# Patient Record
Sex: FEMALE | Race: BLACK OR AFRICAN AMERICAN | NOT HISPANIC OR LATINO | URBAN - METROPOLITAN AREA
[De-identification: names, ages, dates, MRNs, and addresses within clinical notes are randomized per-mention and may not be internally consistent; named-entity substitution may affect disease eponyms.]

---

## 2020-07-25 ENCOUNTER — TELEPHONE ENCOUNTER (OUTPATIENT)
Dept: URBAN - METROPOLITAN AREA CLINIC 13 | Facility: CLINIC | Age: 40
End: 2020-07-25

## 2020-07-25 RX ORDER — LANSOPRAZOLE 30 MG/1
TAKE 1 CAPSULE DAILY CAPSULE, DELAYED RELEASE ORAL
Qty: 30 | Refills: 5 | OUTPATIENT
Start: 2012-06-21

## 2020-07-25 RX ORDER — DEXLANSOPRAZOLE 60 MG/1
TAKE 1 CAPSULE DAILY EVERY MORNING BEFORE BREAKFAST CAPSULE, DELAYED RELEASE ORAL
Qty: 90 | Refills: 3 | OUTPATIENT
Start: 2012-06-20 | End: 2012-07-16

## 2020-07-25 RX ORDER — LANSOPRAZOLE 30 MG/1
TAKE 1 CAPSULE EVERY MORNING DAILY CAPSULE, DELAYED RELEASE ORAL
Qty: 90 | Refills: 3 | OUTPATIENT
Start: 2012-06-22 | End: 2012-07-31

## 2020-07-25 RX ORDER — ZOLPIDEM TARTRATE 10 MG/1
TAKE 1 TABLET DAILY MOMDAY-FRIDAY TABLET, FILM COATED ORAL
Qty: 15 | Refills: 0 | OUTPATIENT
Start: 2012-06-05 | End: 2012-07-31

## 2020-07-26 ENCOUNTER — TELEPHONE ENCOUNTER (OUTPATIENT)
Dept: URBAN - METROPOLITAN AREA CLINIC 13 | Facility: CLINIC | Age: 40
End: 2020-07-26

## 2020-07-26 RX ORDER — CLINDAMYCIN PHOSPHATE 100 MG/1
SUPPOSITORY VAGINAL
Qty: 3 | Refills: 0 | Status: ACTIVE | COMMUNITY
Start: 2011-12-29

## 2020-07-26 RX ORDER — CHLORHEXIDINE GLUCONATE 4 %
TAKE 1 TABLET BY MOUTH EVERY DAY LIQUID (ML) TOPICAL
Qty: 30 | Refills: 0 | Status: ACTIVE | COMMUNITY
Start: 2011-12-27

## 2020-07-26 RX ORDER — AMOXICILLIN 875 MG/1
TABLET, FILM COATED ORAL
Qty: 20 | Refills: 0 | Status: ACTIVE | COMMUNITY
Start: 2012-01-08

## 2020-07-26 RX ORDER — NORGESTIMATE AND ETHINYL ESTRADIOL
KIT
Qty: 28 | Refills: 0 | Status: ACTIVE | COMMUNITY
Start: 2012-01-15

## 2020-07-26 RX ORDER — NORGESTIMATE AND ETHINYL ESTRADIOL
KIT
Qty: 28 | Refills: 0 | Status: ACTIVE | COMMUNITY
Start: 2011-10-23

## 2020-07-26 RX ORDER — FLUCONAZOLE 150 MG/1
TABLET ORAL
Qty: 2 | Refills: 0 | Status: ACTIVE | COMMUNITY
Start: 2012-03-11

## 2020-07-26 RX ORDER — FLUTICASONE PROPIONATE 50 UG/1
INHALE 2 SPRAYS INTO EACH NOSTRIL EVERY DAY SPRAY, METERED NASAL
Qty: 16 | Refills: 0 | Status: ACTIVE | COMMUNITY
Start: 2012-01-08

## 2020-07-26 RX ORDER — NORGESTIMATE AND ETHINYL ESTRADIOL
TAKE 1 TABLET BY MOUTH EVERY DAY KIT
Qty: 28 | Refills: 0 | Status: ACTIVE | COMMUNITY
Start: 2011-11-22

## 2020-07-26 RX ORDER — DEXTROSE 4 G
TAKE 1 TABLET EVERY DAY TABLET,CHEWABLE ORAL
Qty: 30 | Refills: 0 | Status: ACTIVE | COMMUNITY
Start: 2012-07-10

## 2020-07-26 RX ORDER — NYSTATIN AND TRIAMCINOLONE ACETONIDE 100000; 1 MG/G; MG/G
CREAM TOPICAL
Qty: 15 | Refills: 0 | Status: ACTIVE | COMMUNITY
Start: 2012-02-03

## 2020-07-26 RX ORDER — FLUCONAZOLE 150 MG/1
TAKE 1 TABLET BY MOUTH NOW AND REPEAT IN 72 HOURS TABLET ORAL
Qty: 2 | Refills: 0 | Status: ACTIVE | COMMUNITY
Start: 2012-03-09

## 2020-07-26 RX ORDER — LORATADINE 5 MG/5 ML
TAKE AS DIRECTED SOLUTION, ORAL ORAL
Qty: 10 | Refills: 0 | Status: ACTIVE | COMMUNITY
Start: 2012-07-10

## 2020-07-26 RX ORDER — FAMOTIDINE 20 MG/1
TAKE 1 TABLET BY MOUTH EVERY DAY TABLET ORAL
Qty: 50 | Refills: 0 | Status: ACTIVE | COMMUNITY
Start: 2012-06-05

## 2021-12-08 ENCOUNTER — TELEPHONE ENCOUNTER (OUTPATIENT)
Dept: URBAN - METROPOLITAN AREA CLINIC 113 | Facility: CLINIC | Age: 41
End: 2021-12-08

## 2021-12-08 ENCOUNTER — OFFICE VISIT (OUTPATIENT)
Dept: URBAN - METROPOLITAN AREA CLINIC 107 | Facility: CLINIC | Age: 41
End: 2021-12-08
Payer: COMMERCIAL

## 2021-12-08 ENCOUNTER — WEB ENCOUNTER (OUTPATIENT)
Dept: URBAN - METROPOLITAN AREA CLINIC 107 | Facility: CLINIC | Age: 41
End: 2021-12-08

## 2021-12-08 VITALS
HEIGHT: 62 IN | HEART RATE: 84 BPM | SYSTOLIC BLOOD PRESSURE: 116 MMHG | WEIGHT: 149 LBS | DIASTOLIC BLOOD PRESSURE: 85 MMHG | TEMPERATURE: 98.8 F | BODY MASS INDEX: 27.42 KG/M2 | RESPIRATION RATE: 18 BRPM

## 2021-12-08 DIAGNOSIS — K21.9 GERD: ICD-10-CM

## 2021-12-08 DIAGNOSIS — R13.14 PHARYNGOESOPHAGEAL DYSPHAGIA: ICD-10-CM

## 2021-12-08 DIAGNOSIS — K59.09 CHANGE IN BOWEL MOVEMENTS INTERMITTENT CONSTIPATION. URGENCY IN THE MORNING.: ICD-10-CM

## 2021-12-08 DIAGNOSIS — R05.9 COUGH: ICD-10-CM

## 2021-12-08 DIAGNOSIS — R14.2 ERUCTATION: ICD-10-CM

## 2021-12-08 PROCEDURE — 99244 OFF/OP CNSLTJ NEW/EST MOD 40: CPT | Performed by: NURSE PRACTITIONER

## 2021-12-08 PROCEDURE — 99204 OFFICE O/P NEW MOD 45 MIN: CPT | Performed by: NURSE PRACTITIONER

## 2021-12-08 RX ORDER — DEXTROSE 4 G
TAKE 1 TABLET EVERY DAY TABLET,CHEWABLE ORAL
Qty: 30 | Refills: 0 | Status: ON HOLD | COMMUNITY
Start: 2012-07-10

## 2021-12-08 RX ORDER — FAMOTIDINE 20 MG/1
TAKE 1 TABLET BY MOUTH EVERY DAY TABLET ORAL
Qty: 50 | Refills: 0 | Status: ON HOLD | COMMUNITY
Start: 2012-06-05

## 2021-12-08 RX ORDER — ESOMEPRAZOLE MAGNESIUM 40 MG/1
1 CAPSULE CAPSULE, DELAYED RELEASE ORAL ONCE A DAY
OUTPATIENT

## 2021-12-08 RX ORDER — CHLORHEXIDINE GLUCONATE 4 %
TAKE 1 TABLET BY MOUTH EVERY DAY LIQUID (ML) TOPICAL
Qty: 30 | Refills: 0 | Status: ON HOLD | COMMUNITY
Start: 2011-12-27

## 2021-12-08 RX ORDER — AMOXICILLIN 875 MG/1
TABLET, FILM COATED ORAL
Qty: 20 | Refills: 0 | Status: ON HOLD | COMMUNITY
Start: 2012-01-08

## 2021-12-08 RX ORDER — CLINDAMYCIN PHOSPHATE 100 MG/1
SUPPOSITORY VAGINAL
Qty: 3 | Refills: 0 | Status: ON HOLD | COMMUNITY
Start: 2011-12-29

## 2021-12-08 RX ORDER — ESOMEPRAZOLE MAGNESIUM 40 MG/1
1 CAPSULE CAPSULE, DELAYED RELEASE ORAL ONCE A DAY
Status: ACTIVE | COMMUNITY

## 2021-12-08 RX ORDER — FAMOTIDINE 40 MG/1
1 TABLET AT BEDTIME TABLET, FILM COATED ORAL ONCE A DAY
Qty: 90 TABLETS | Refills: 3 | OUTPATIENT
Start: 2021-12-08

## 2021-12-08 RX ORDER — LORATADINE 5 MG/5 ML
TAKE AS DIRECTED SOLUTION, ORAL ORAL
Qty: 10 | Refills: 0 | Status: ON HOLD | COMMUNITY
Start: 2012-07-10

## 2021-12-08 RX ORDER — NORGESTIMATE AND ETHINYL ESTRADIOL
KIT
Qty: 28 | Refills: 0 | Status: ON HOLD | COMMUNITY
Start: 2011-10-23

## 2021-12-08 RX ORDER — NYSTATIN AND TRIAMCINOLONE ACETONIDE 100000; 1 MG/G; MG/G
CREAM TOPICAL
Qty: 15 | Refills: 0 | Status: ON HOLD | COMMUNITY
Start: 2012-02-03

## 2021-12-08 RX ORDER — FLUTICASONE PROPIONATE 50 UG/1
INHALE 2 SPRAYS INTO EACH NOSTRIL EVERY DAY SPRAY, METERED NASAL
Qty: 16 | Refills: 0 | Status: ON HOLD | COMMUNITY
Start: 2012-01-08

## 2021-12-08 RX ORDER — CYCLOBENZAPRINE HYDROCHLORIDE 10 MG/1
1 TABLET AT BEDTIME AS NEEDED TABLET, FILM COATED ORAL ONCE A DAY
Status: ACTIVE | COMMUNITY

## 2021-12-08 RX ORDER — FLUCONAZOLE 150 MG/1
TAKE 1 TABLET BY MOUTH NOW AND REPEAT IN 72 HOURS TABLET ORAL
Qty: 2 | Refills: 0 | Status: ON HOLD | COMMUNITY
Start: 2012-03-09

## 2021-12-08 NOTE — HPI-TODAY'S VISIT:
40yo female referred by Dr. Jose Carson for evaluation of chronic hiccups.  A copy of this document is being forwarded to the referring provider.  On November 23rd, she experienced an exacerbation of chest pain, cough, and weakness. She was seen at urgent care and recommended ED evaluation. She had a normal EKG and negative strep, flu, and coronavirus swab. She was discharged on Augmentin and prednisone. She completed the antibiotics but never began the steroids.  She complains of a persistent cough, with occasional regurgitation when she lies down. She has excess belching. She is taking Nexium 40mg each morning, but continues to experience breakthrough heartburn and burning midchest pain at least a few times per week. The history is somewhat unclear. It seems the symptoms only occurred around the end of November, but have essentially subsided at this point. She does have occasional difficulty swallowing, indicating food will become lodged midchest. This is painful and can be passed with a sip of water. She is sleeping with her head of bed elevated.  She has constipation at baseline, which has responded to milk of magnesia in the past. Currently, she is relying on colon cleanse pills which she bought off of [employer]. She has 1-2 nonbloody stools per day when she takes the supplement, but will not have a bowel movement if she skips the medication.

## 2021-12-14 ENCOUNTER — LAB OUTSIDE AN ENCOUNTER (OUTPATIENT)
Dept: URBAN - METROPOLITAN AREA CLINIC 113 | Facility: CLINIC | Age: 41
End: 2021-12-14

## 2022-01-19 ENCOUNTER — OFFICE VISIT (OUTPATIENT)
Dept: URBAN - METROPOLITAN AREA SURGERY CENTER 25 | Facility: SURGERY CENTER | Age: 42
End: 2022-01-19
Payer: COMMERCIAL

## 2022-01-19 ENCOUNTER — CLAIMS CREATED FROM THE CLAIM WINDOW (OUTPATIENT)
Dept: URBAN - METROPOLITAN AREA CLINIC 4 | Facility: CLINIC | Age: 42
End: 2022-01-19
Payer: COMMERCIAL

## 2022-01-19 DIAGNOSIS — R13.10 DYSPHAGIA: ICD-10-CM

## 2022-01-19 DIAGNOSIS — K31.89 DUODENAL ERYTHEMA: ICD-10-CM

## 2022-01-19 DIAGNOSIS — R10.13 ABDOMINAL PAIN, EPIGASTRIC: ICD-10-CM

## 2022-01-19 PROCEDURE — 43239 EGD BIOPSY SINGLE/MULTIPLE: CPT | Performed by: INTERNAL MEDICINE

## 2022-01-19 PROCEDURE — 88312 SPECIAL STAINS GROUP 1: CPT | Performed by: PATHOLOGY

## 2022-01-19 PROCEDURE — 88305 TISSUE EXAM BY PATHOLOGIST: CPT | Performed by: PATHOLOGY

## 2022-01-19 PROCEDURE — G8907 PT DOC NO EVENTS ON DISCHARG: HCPCS | Performed by: INTERNAL MEDICINE

## 2022-01-19 RX ORDER — AMOXICILLIN 875 MG/1
TABLET, FILM COATED ORAL
Qty: 20 | Refills: 0 | Status: ON HOLD | COMMUNITY
Start: 2012-01-08

## 2022-01-19 RX ORDER — NYSTATIN AND TRIAMCINOLONE ACETONIDE 100000; 1 MG/G; MG/G
CREAM TOPICAL
Qty: 15 | Refills: 0 | Status: ON HOLD | COMMUNITY
Start: 2012-02-03

## 2022-01-19 RX ORDER — CHLORHEXIDINE GLUCONATE 4 %
TAKE 1 TABLET BY MOUTH EVERY DAY LIQUID (ML) TOPICAL
Qty: 30 | Refills: 0 | Status: ON HOLD | COMMUNITY
Start: 2011-12-27

## 2022-01-19 RX ORDER — FAMOTIDINE 20 MG/1
TAKE 1 TABLET BY MOUTH EVERY DAY TABLET ORAL
Qty: 50 | Refills: 0 | Status: ON HOLD | COMMUNITY
Start: 2012-06-05

## 2022-01-19 RX ORDER — LORATADINE 5 MG/5 ML
TAKE AS DIRECTED SOLUTION, ORAL ORAL
Qty: 10 | Refills: 0 | Status: ON HOLD | COMMUNITY
Start: 2012-07-10

## 2022-01-19 RX ORDER — FAMOTIDINE 40 MG/1
1 TABLET AT BEDTIME TABLET, FILM COATED ORAL ONCE A DAY
Qty: 90 TABLETS | Refills: 3 | Status: ACTIVE | COMMUNITY
Start: 2021-12-08

## 2022-01-19 RX ORDER — NORGESTIMATE AND ETHINYL ESTRADIOL
KIT
Qty: 28 | Refills: 0 | Status: ON HOLD | COMMUNITY
Start: 2011-10-23

## 2022-01-19 RX ORDER — FLUTICASONE PROPIONATE 50 UG/1
INHALE 2 SPRAYS INTO EACH NOSTRIL EVERY DAY SPRAY, METERED NASAL
Qty: 16 | Refills: 0 | Status: ON HOLD | COMMUNITY
Start: 2012-01-08

## 2022-01-19 RX ORDER — DEXTROSE 4 G
TAKE 1 TABLET EVERY DAY TABLET,CHEWABLE ORAL
Qty: 30 | Refills: 0 | Status: ON HOLD | COMMUNITY
Start: 2012-07-10

## 2022-01-19 RX ORDER — CLINDAMYCIN PHOSPHATE 100 MG/1
SUPPOSITORY VAGINAL
Qty: 3 | Refills: 0 | Status: ON HOLD | COMMUNITY
Start: 2011-12-29

## 2022-01-19 RX ORDER — CYCLOBENZAPRINE HYDROCHLORIDE 10 MG/1
1 TABLET AT BEDTIME AS NEEDED TABLET, FILM COATED ORAL ONCE A DAY
Status: ACTIVE | COMMUNITY

## 2022-01-19 RX ORDER — FLUCONAZOLE 150 MG/1
TAKE 1 TABLET BY MOUTH NOW AND REPEAT IN 72 HOURS TABLET ORAL
Qty: 2 | Refills: 0 | Status: ON HOLD | COMMUNITY
Start: 2012-03-09

## 2022-01-19 RX ORDER — ESOMEPRAZOLE MAGNESIUM 40 MG/1
1 CAPSULE CAPSULE, DELAYED RELEASE ORAL ONCE A DAY
Status: ACTIVE | COMMUNITY

## 2022-02-14 ENCOUNTER — OFFICE VISIT (OUTPATIENT)
Dept: URBAN - METROPOLITAN AREA CLINIC 107 | Facility: CLINIC | Age: 42
End: 2022-02-14
Payer: COMMERCIAL

## 2022-02-14 ENCOUNTER — DASHBOARD ENCOUNTERS (OUTPATIENT)
Age: 42
End: 2022-02-14

## 2022-02-14 VITALS
RESPIRATION RATE: 18 BRPM | DIASTOLIC BLOOD PRESSURE: 86 MMHG | HEIGHT: 62 IN | SYSTOLIC BLOOD PRESSURE: 116 MMHG | TEMPERATURE: 97.7 F | BODY MASS INDEX: 28.16 KG/M2 | HEART RATE: 77 BPM | WEIGHT: 153 LBS

## 2022-02-14 DIAGNOSIS — K29.30 CHRONIC SUPERFICIAL GASTRITIS WITHOUT BLEEDING: ICD-10-CM

## 2022-02-14 DIAGNOSIS — K59.01 SLOW TRANSIT CONSTIPATION: ICD-10-CM

## 2022-02-14 DIAGNOSIS — K21.9 GERD: ICD-10-CM

## 2022-02-14 PROBLEM — 235595009 GASTROESOPHAGEAL REFLUX DISEASE: Status: ACTIVE | Noted: 2021-12-08

## 2022-02-14 PROBLEM — 40739000 DYSPHAGIA: Status: ACTIVE | Noted: 2021-12-08

## 2022-02-14 PROBLEM — 196735001: Status: ACTIVE | Noted: 2022-02-14

## 2022-02-14 PROBLEM — 35298007 SLOW TRANSIT CONSTIPATION: Status: ACTIVE | Noted: 2021-12-08

## 2022-02-14 PROCEDURE — 99213 OFFICE O/P EST LOW 20 MIN: CPT | Performed by: INTERNAL MEDICINE

## 2022-02-14 RX ORDER — NORGESTIMATE AND ETHINYL ESTRADIOL
KIT
Qty: 28 | Refills: 0 | Status: ON HOLD | COMMUNITY
Start: 2011-10-23

## 2022-02-14 RX ORDER — CYCLOBENZAPRINE HYDROCHLORIDE 10 MG/1
1 TABLET AT BEDTIME AS NEEDED TABLET, FILM COATED ORAL ONCE A DAY
Status: ACTIVE | COMMUNITY

## 2022-02-14 RX ORDER — ESOMEPRAZOLE MAGNESIUM 40 MG/1
1 CAPSULE CAPSULE, DELAYED RELEASE ORAL ONCE A DAY
OUTPATIENT

## 2022-02-14 RX ORDER — ESOMEPRAZOLE MAGNESIUM 40 MG/1
1 CAPSULE CAPSULE, DELAYED RELEASE ORAL ONCE A DAY
Status: ACTIVE | COMMUNITY

## 2022-02-14 RX ORDER — DEXTROSE 4 G
TAKE 1 TABLET EVERY DAY TABLET,CHEWABLE ORAL
Qty: 30 | Refills: 0 | Status: ON HOLD | COMMUNITY
Start: 2012-07-10

## 2022-02-14 RX ORDER — CLINDAMYCIN PHOSPHATE 100 MG/1
SUPPOSITORY VAGINAL
Qty: 3 | Refills: 0 | Status: ON HOLD | COMMUNITY
Start: 2011-12-29

## 2022-02-14 RX ORDER — FLUCONAZOLE 150 MG/1
TAKE 1 TABLET BY MOUTH NOW AND REPEAT IN 72 HOURS TABLET ORAL
Qty: 2 | Refills: 0 | Status: ON HOLD | COMMUNITY
Start: 2012-03-09

## 2022-02-14 RX ORDER — LORATADINE 5 MG/5 ML
TAKE AS DIRECTED SOLUTION, ORAL ORAL
Qty: 10 | Refills: 0 | Status: ON HOLD | COMMUNITY
Start: 2012-07-10

## 2022-02-14 RX ORDER — FLUTICASONE PROPIONATE 50 UG/1
INHALE 2 SPRAYS INTO EACH NOSTRIL EVERY DAY SPRAY, METERED NASAL
Qty: 16 | Refills: 0 | Status: ON HOLD | COMMUNITY
Start: 2012-01-08

## 2022-02-14 RX ORDER — AMOXICILLIN 875 MG/1
TABLET, FILM COATED ORAL
Qty: 20 | Refills: 0 | Status: ON HOLD | COMMUNITY
Start: 2012-01-08

## 2022-02-14 RX ORDER — FAMOTIDINE 40 MG/1
1 TABLET AT BEDTIME TABLET, FILM COATED ORAL ONCE A DAY
Qty: 90 TABLETS | Refills: 3 | Status: ACTIVE | COMMUNITY
Start: 2021-12-08

## 2022-02-14 RX ORDER — CHLORHEXIDINE GLUCONATE 4 %
TAKE 1 TABLET BY MOUTH EVERY DAY LIQUID (ML) TOPICAL
Qty: 30 | Refills: 0 | Status: ON HOLD | COMMUNITY
Start: 2011-12-27

## 2022-02-14 RX ORDER — NYSTATIN AND TRIAMCINOLONE ACETONIDE 100000; 1 MG/G; MG/G
CREAM TOPICAL
Qty: 15 | Refills: 0 | Status: ON HOLD | COMMUNITY
Start: 2012-02-03

## 2022-02-14 RX ORDER — FAMOTIDINE 20 MG/1
TAKE 1 TABLET BY MOUTH EVERY DAY TABLET ORAL
Qty: 50 | Refills: 0 | Status: ON HOLD | COMMUNITY
Start: 2012-06-05

## 2022-02-14 NOTE — HPI-TODAY'S VISIT:
Ms. Brambila is a 40yo female initially referred by Dr. Jose Carson for evaluation of chronic hiccups here for EGD follow up.  EGD 1/19/2022 revealed a normal esophagus, normal Z-line, Hill grade 2 gastroesophageal valve, mild nonerosive gastritis and normal duodenum.  Gastric biopsies were negative for H. pylori.  Overall her symptoms have improved.  She is on Nexium once daily and denies any significant breakthrough GERD, heartburn, regurgitation, nausea or vomiting.  She is having a daily bowel movement is using milk of magnesia around 3 times a week.  She denies blood in the stool or abdominal pain.

## 2023-12-13 ENCOUNTER — TELEPHONE ENCOUNTER (OUTPATIENT)
Dept: URBAN - METROPOLITAN AREA CLINIC 107 | Facility: CLINIC | Age: 43
End: 2023-12-13

## 2024-06-27 ENCOUNTER — OFFICE VISIT (OUTPATIENT)
Dept: URBAN - METROPOLITAN AREA CLINIC 107 | Facility: CLINIC | Age: 44
End: 2024-06-27
Payer: COMMERCIAL

## 2024-06-27 VITALS
WEIGHT: 154 LBS | BODY MASS INDEX: 28.34 KG/M2 | HEIGHT: 62 IN | RESPIRATION RATE: 18 BRPM | OXYGEN SATURATION: 98 % | TEMPERATURE: 97.9 F | DIASTOLIC BLOOD PRESSURE: 66 MMHG | SYSTOLIC BLOOD PRESSURE: 108 MMHG | HEART RATE: 86 BPM

## 2024-06-27 DIAGNOSIS — K58.1 IRRITABLE BOWEL SYNDROME WITH CONSTIPATION: ICD-10-CM

## 2024-06-27 DIAGNOSIS — K21.9 GERD: ICD-10-CM

## 2024-06-27 DIAGNOSIS — R09.A2 GLOBUS SENSATION: ICD-10-CM

## 2024-06-27 PROBLEM — 440630006: Status: ACTIVE | Noted: 2024-06-27

## 2024-06-27 PROCEDURE — 99214 OFFICE O/P EST MOD 30 MIN: CPT | Performed by: INTERNAL MEDICINE

## 2024-06-27 RX ORDER — ESOMEPRAZOLE MAGNESIUM 40 MG/1
1 CAPSULE CAPSULE, DELAYED RELEASE ORAL ONCE A DAY
Status: ON HOLD | COMMUNITY

## 2024-06-27 RX ORDER — CLINDAMYCIN PHOSPHATE 100 MG/1
SUPPOSITORY VAGINAL
Qty: 3 | Refills: 0 | Status: ON HOLD | COMMUNITY
Start: 2011-12-29

## 2024-06-27 RX ORDER — FLUCONAZOLE 150 MG/1
TAKE 1 TABLET BY MOUTH NOW AND REPEAT IN 72 HOURS TABLET ORAL
Qty: 2 | Refills: 0 | Status: ON HOLD | COMMUNITY
Start: 2012-03-09

## 2024-06-27 RX ORDER — LORATADINE 5 MG/5 ML
TAKE AS DIRECTED SOLUTION, ORAL ORAL
Qty: 10 | Refills: 0 | Status: ON HOLD | COMMUNITY
Start: 2012-07-10

## 2024-06-27 RX ORDER — FAMOTIDINE 40 MG/1
1 TABLET 30 MINUTES BEFORE BREAKFAST AND DINNER TABLET, FILM COATED ORAL TWICE A DAY
Qty: 60 | Refills: 1 | OUTPATIENT
Start: 2024-06-27

## 2024-06-27 RX ORDER — CHLORHEXIDINE GLUCONATE 4 %
TAKE 1 TABLET BY MOUTH EVERY DAY LIQUID (ML) TOPICAL
Qty: 30 | Refills: 0 | Status: ON HOLD | COMMUNITY
Start: 2011-12-27

## 2024-06-27 RX ORDER — FLUTICASONE PROPIONATE 50 UG/1
INHALE 2 SPRAYS INTO EACH NOSTRIL EVERY DAY SPRAY, METERED NASAL
Qty: 16 | Refills: 0 | Status: ON HOLD | COMMUNITY
Start: 2012-01-08

## 2024-06-27 RX ORDER — AMOXICILLIN 875 MG/1
TABLET, FILM COATED ORAL
Qty: 20 | Refills: 0 | Status: ON HOLD | COMMUNITY
Start: 2012-01-08

## 2024-06-27 RX ORDER — FAMOTIDINE 20 MG/1
TAKE 1 TABLET BY MOUTH EVERY DAY TABLET ORAL
Qty: 50 | Refills: 0 | Status: ON HOLD | COMMUNITY
Start: 2012-06-05

## 2024-06-27 RX ORDER — CYCLOBENZAPRINE HYDROCHLORIDE 10 MG/1
1 TABLET AT BEDTIME AS NEEDED TABLET, FILM COATED ORAL ONCE A DAY
Status: ON HOLD | COMMUNITY

## 2024-06-27 RX ORDER — FAMOTIDINE 40 MG/1
1 TABLET AT BEDTIME TABLET, FILM COATED ORAL ONCE A DAY
Qty: 90 TABLETS | Refills: 3 | Status: ON HOLD | COMMUNITY
Start: 2021-12-08

## 2024-06-27 RX ORDER — DEXTROSE 4 G
TAKE 1 TABLET EVERY DAY TABLET,CHEWABLE ORAL
Qty: 30 | Refills: 0 | Status: ON HOLD | COMMUNITY
Start: 2012-07-10

## 2024-06-27 RX ORDER — NYSTATIN AND TRIAMCINOLONE ACETONIDE 100000; 1 MG/G; MG/G
CREAM TOPICAL
Qty: 15 | Refills: 0 | Status: ON HOLD | COMMUNITY
Start: 2012-02-03

## 2024-06-27 RX ORDER — NORGESTIMATE AND ETHINYL ESTRADIOL
KIT
Qty: 28 | Refills: 0 | Status: ON HOLD | COMMUNITY
Start: 2011-10-23

## 2024-06-27 NOTE — HPI-OTHER HISTORIES
Labs 7/19/21: H/H 10.9/32.9, MCV 69, Plt 311, WBC 5.3. CMP unremarkable. Normal TSH. Ferritin 75. Esoguard test for Samuels's 4/25/2024 was negative. Labs 6/19/2024. CMP: Glucose low at 58 normal LFTs. Hepatitis panel normal. RPR and HIV nonreactive.  EGD 1/19/2022 revealed a normal esophagus, normal Z-line, Hill grade 2 gastroesophageal valve, mild nonerosive gastritis and normal duodenum. Gastric biopsies were negative for H. pylori.

## 2024-06-27 NOTE — HPI-TODAY'S VISIT:
43-year-old female referred here by Dr. Carson for acid reflux.   A copy of this note will be sent to the referring provider.   Per referral note has palpitations has been referred to Dr. Valentine. Holter showing PVCs and PACs with some sinus tach.  Last seen 2/14/2022 for EGD follow-up with GERD advised to continue Nexium 40 mg daily.  Also has underlying constipation continue milk of magnesia as needed and high-fiber diet.  Records reviewed including labs from refrring provider on 4/25/24 and 6/19/24, see Other Histories below. She reports acid reflux is worse and has globus sensation.  Had double contrast barium swallow in May showing reflux. Tums help.  Was told when she was asleep by her  that she was coughing a lot. She doesn't cough during the day.  She stopped taking the Nexium since it stopped working, tried aciphex but had heart palpitations from that.  Has an appointment in July with cardiology.  Has constipation on Linzess which she feels is not effective.    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

## 2024-07-25 ENCOUNTER — OFFICE VISIT (OUTPATIENT)
Dept: URBAN - METROPOLITAN AREA CLINIC 107 | Facility: CLINIC | Age: 44
End: 2024-07-25

## 2024-08-13 ENCOUNTER — OFFICE VISIT (OUTPATIENT)
Dept: URBAN - METROPOLITAN AREA CLINIC 107 | Facility: CLINIC | Age: 44
End: 2024-08-13

## 2024-08-13 RX ORDER — FAMOTIDINE 20 MG/1
TAKE 1 TABLET BY MOUTH EVERY DAY TABLET ORAL
Qty: 50 | Refills: 0 | Status: ON HOLD | COMMUNITY
Start: 2012-06-05

## 2024-08-13 RX ORDER — FLUTICASONE PROPIONATE 50 UG/1
INHALE 2 SPRAYS INTO EACH NOSTRIL EVERY DAY SPRAY, METERED NASAL
Qty: 16 | Refills: 0 | Status: ON HOLD | COMMUNITY
Start: 2012-01-08

## 2024-08-13 RX ORDER — CLINDAMYCIN PHOSPHATE 100 MG/1
SUPPOSITORY VAGINAL
Qty: 3 | Refills: 0 | Status: ON HOLD | COMMUNITY
Start: 2011-12-29

## 2024-08-13 RX ORDER — DEXTROSE 4 G
TAKE 1 TABLET EVERY DAY TABLET,CHEWABLE ORAL
Qty: 30 | Refills: 0 | Status: ON HOLD | COMMUNITY
Start: 2012-07-10

## 2024-08-13 RX ORDER — FAMOTIDINE 40 MG/1
1 TABLET 30 MINUTES BEFORE BREAKFAST AND DINNER TABLET, FILM COATED ORAL TWICE A DAY
Qty: 60 | Refills: 1 | Status: ACTIVE | COMMUNITY
Start: 2024-06-27

## 2024-08-13 RX ORDER — AMOXICILLIN 875 MG/1
TABLET, FILM COATED ORAL
Qty: 20 | Refills: 0 | Status: ON HOLD | COMMUNITY
Start: 2012-01-08

## 2024-08-13 RX ORDER — FLUCONAZOLE 150 MG/1
TAKE 1 TABLET BY MOUTH NOW AND REPEAT IN 72 HOURS TABLET ORAL
Qty: 2 | Refills: 0 | Status: ON HOLD | COMMUNITY
Start: 2012-03-09

## 2024-08-13 RX ORDER — CHLORHEXIDINE GLUCONATE 4 %
TAKE 1 TABLET BY MOUTH EVERY DAY LIQUID (ML) TOPICAL
Qty: 30 | Refills: 0 | Status: ON HOLD | COMMUNITY
Start: 2011-12-27

## 2024-08-13 RX ORDER — NORGESTIMATE AND ETHINYL ESTRADIOL
KIT
Qty: 28 | Refills: 0 | Status: ON HOLD | COMMUNITY
Start: 2011-10-23

## 2024-08-13 RX ORDER — LORATADINE 5 MG/5 ML
TAKE AS DIRECTED SOLUTION, ORAL ORAL
Qty: 10 | Refills: 0 | Status: ON HOLD | COMMUNITY
Start: 2012-07-10

## 2024-08-13 RX ORDER — CYCLOBENZAPRINE HYDROCHLORIDE 10 MG/1
1 TABLET AT BEDTIME AS NEEDED TABLET, FILM COATED ORAL ONCE A DAY
Status: ON HOLD | COMMUNITY

## 2024-08-13 RX ORDER — ESOMEPRAZOLE MAGNESIUM 40 MG/1
1 CAPSULE CAPSULE, DELAYED RELEASE ORAL ONCE A DAY
Status: ON HOLD | COMMUNITY

## 2024-08-13 RX ORDER — FAMOTIDINE 40 MG/1
1 TABLET AT BEDTIME TABLET, FILM COATED ORAL ONCE A DAY
Qty: 90 TABLETS | Refills: 3 | Status: ON HOLD | COMMUNITY
Start: 2021-12-08

## 2024-08-13 RX ORDER — NYSTATIN AND TRIAMCINOLONE ACETONIDE 100000; 1 MG/G; MG/G
CREAM TOPICAL
Qty: 15 | Refills: 0 | Status: ON HOLD | COMMUNITY
Start: 2012-02-03

## 2024-08-13 NOTE — HPI-TODAY'S VISIT:
43-year-old female referred here for a 4 week follow-up.  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 Also has underlying constipation continue milk of magnesia as needed and high-fiber diet.  Last seen 6/27/2024 for GERD, globus sensation and IBS constipation.  She reports acid reflux is worse and has globus sensation.  Had double contrast barium swallow in May showing reflux. Tums help. Was told when she was asleep by her  that she was coughing a lot. She doesn't cough during the day.  She stopped taking the Nexium since it stopped working, tried aciphex but had heart palpitations from that.  Has an appointment in July with cardiology.  Has constipation on Linzess which she feels is not effective.  It was noted she is currently undergoing cardiac evaluation for palpitations.  Insurance does not cover as omeprazole and Aciphex gave her palpitations.  She was started on famotidine 40 mg twice a day.  Consider repeat EGD.  For constipation given Ibsrela samples consider colonoscopy.  Interval follow-up 8/13/2024

## 2024-08-19 ENCOUNTER — OFFICE VISIT (OUTPATIENT)
Dept: URBAN - METROPOLITAN AREA CLINIC 107 | Facility: CLINIC | Age: 44
End: 2024-08-19

## 2024-08-22 ENCOUNTER — OFFICE VISIT (OUTPATIENT)
Dept: URBAN - METROPOLITAN AREA CLINIC 107 | Facility: CLINIC | Age: 44
End: 2024-08-22

## 2024-09-03 ENCOUNTER — TELEPHONE ENCOUNTER (OUTPATIENT)
Dept: URBAN - METROPOLITAN AREA CLINIC 113 | Facility: CLINIC | Age: 44
End: 2024-09-03

## 2024-09-03 RX ORDER — FAMOTIDINE 40 MG/1
1 TABLET 30 MINUTES BEFORE BREAKFAST AND DINNER TABLET, FILM COATED ORAL TWICE A DAY
Qty: 60 | Refills: 1
Start: 2024-06-27

## 2024-09-12 ENCOUNTER — OFFICE VISIT (OUTPATIENT)
Dept: URBAN - METROPOLITAN AREA CLINIC 107 | Facility: CLINIC | Age: 44
End: 2024-09-12
Payer: COMMERCIAL

## 2024-09-12 VITALS
BODY MASS INDEX: 27.27 KG/M2 | HEART RATE: 73 BPM | HEIGHT: 62 IN | SYSTOLIC BLOOD PRESSURE: 106 MMHG | TEMPERATURE: 98.1 F | WEIGHT: 148.2 LBS | DIASTOLIC BLOOD PRESSURE: 75 MMHG

## 2024-09-12 DIAGNOSIS — K21.9 GERD: ICD-10-CM

## 2024-09-12 DIAGNOSIS — R09.A2 GLOBUS SENSATION: ICD-10-CM

## 2024-09-12 DIAGNOSIS — K58.1 IRRITABLE BOWEL SYNDROME WITH CONSTIPATION: ICD-10-CM

## 2024-09-12 PROCEDURE — 99214 OFFICE O/P EST MOD 30 MIN: CPT | Performed by: NURSE PRACTITIONER

## 2024-09-12 RX ORDER — CLINDAMYCIN PHOSPHATE 100 MG/1
SUPPOSITORY VAGINAL
Qty: 3 | Refills: 0 | Status: ON HOLD | COMMUNITY
Start: 2011-12-29

## 2024-09-12 RX ORDER — DEXTROSE 4 G
TAKE 1 TABLET EVERY DAY TABLET,CHEWABLE ORAL
Qty: 30 | Refills: 0 | Status: ON HOLD | COMMUNITY
Start: 2012-07-10

## 2024-09-12 RX ORDER — AMOXICILLIN 875 MG/1
TABLET, FILM COATED ORAL
Qty: 20 | Refills: 0 | Status: ON HOLD | COMMUNITY
Start: 2012-01-08

## 2024-09-12 RX ORDER — NORGESTIMATE AND ETHINYL ESTRADIOL
KIT
Qty: 28 | Refills: 0 | Status: ON HOLD | COMMUNITY
Start: 2011-10-23

## 2024-09-12 RX ORDER — FAMOTIDINE 40 MG/1
1 TABLET AT BEDTIME TABLET, FILM COATED ORAL ONCE A DAY
Qty: 90 TABLETS | Refills: 3 | Status: ON HOLD | COMMUNITY
Start: 2021-12-08

## 2024-09-12 RX ORDER — LINACLOTIDE 145 UG/1
1 CAPSULE AT LEAST 30 MINUTES BEFORE THE FIRST MEAL OF THE DAY ON AN EMPTY STOMACH CAPSULE, GELATIN COATED ORAL ONCE A DAY
Qty: 90 | Refills: 1 | OUTPATIENT
Start: 2024-09-12 | End: 2025-03-11

## 2024-09-12 RX ORDER — CHLORHEXIDINE GLUCONATE 4 %
TAKE 1 TABLET BY MOUTH EVERY DAY LIQUID (ML) TOPICAL
Qty: 30 | Refills: 0 | Status: ON HOLD | COMMUNITY
Start: 2011-12-27

## 2024-09-12 RX ORDER — FAMOTIDINE 20 MG/1
TAKE 1 TABLET BY MOUTH EVERY DAY TABLET ORAL
Qty: 50 | Refills: 0 | Status: ON HOLD | COMMUNITY
Start: 2012-06-05

## 2024-09-12 RX ORDER — FLUCONAZOLE 150 MG/1
TAKE 1 TABLET BY MOUTH NOW AND REPEAT IN 72 HOURS TABLET ORAL
Qty: 2 | Refills: 0 | Status: ON HOLD | COMMUNITY
Start: 2012-03-09

## 2024-09-12 RX ORDER — FAMOTIDINE 40 MG/1
1 TABLET 30 MINUTES BEFORE BREAKFAST AND DINNER TABLET, FILM COATED ORAL TWICE A DAY
Qty: 180 TABLET | Refills: 1 | OUTPATIENT

## 2024-09-12 RX ORDER — NYSTATIN AND TRIAMCINOLONE ACETONIDE 100000; 1 MG/G; MG/G
CREAM TOPICAL
Qty: 15 | Refills: 0 | Status: ON HOLD | COMMUNITY
Start: 2012-02-03

## 2024-09-12 RX ORDER — FLUTICASONE PROPIONATE 50 UG/1
INHALE 2 SPRAYS INTO EACH NOSTRIL EVERY DAY SPRAY, METERED NASAL
Qty: 16 | Refills: 0 | Status: ON HOLD | COMMUNITY
Start: 2012-01-08

## 2024-09-12 RX ORDER — ESOMEPRAZOLE MAGNESIUM 40 MG/1
1 CAPSULE CAPSULE, DELAYED RELEASE ORAL ONCE A DAY
Status: ON HOLD | COMMUNITY

## 2024-09-12 RX ORDER — FAMOTIDINE 40 MG/1
1 TABLET 30 MINUTES BEFORE BREAKFAST AND DINNER TABLET, FILM COATED ORAL TWICE A DAY
Qty: 60 | Refills: 1 | Status: ACTIVE | COMMUNITY
Start: 2024-06-27

## 2024-09-12 RX ORDER — CYCLOBENZAPRINE HYDROCHLORIDE 10 MG/1
1 TABLET AT BEDTIME AS NEEDED TABLET, FILM COATED ORAL ONCE A DAY
Status: ON HOLD | COMMUNITY

## 2024-09-12 RX ORDER — LORATADINE 5 MG/5 ML
TAKE AS DIRECTED SOLUTION, ORAL ORAL
Qty: 10 | Refills: 0 | Status: ON HOLD | COMMUNITY
Start: 2012-07-10

## 2024-09-12 NOTE — HPI-TODAY'S VISIT:
43-year-old female referred here for a 4-week follow-up.  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 Also has underlying constipation continue milk of magnesia as needed and high-fiber diet.  Last seen 6/27/2024 for GERD, globus sensation and IBS constipation.  She reports acid reflux is worse and has globus sensation.  Had double contrast barium swallow in May showing reflux. Tums help. Was told when she was asleep by her  that she was coughing a lot. She doesn't cough during the day.  She stopped taking the Nexium since it stopped working, tried AcipHex but had heart palpitations from that.  Has an appointment in July with cardiology.  Has constipation on Linzess which she feels is not effective.  It was noted she is currently undergoing cardiac evaluation for palpitations.  Insurance does not cover as omeprazole and AcipHex gave her palpitations.  She was started on famotidine 40 mg twice a day.  Consider repeat EGD.  For constipation given Ibsrela samples consider colonoscopy.  Interval history, 9/12/2024. Barium swallow 5/30/2024 revealed mild gastroesophageal reflux noted to mid thoracic esophagus.  13 mm barium pill passed without difficulty.  Today she reports she is doing much better on famotidine twice a day.  No reflux or globus sensation.  No dysphagia. Ibsrela was not effective she is back on Linzess 145, but it is not covered well by insurance.  Melena or hematochezia.  No abdominal pain. Weight is down 6 pounds from her last appointment.  She is watching what she eats and currently using invisible line which she states causes some dental pain and made be contributing to the weight loss.  Had cardiac workup states echo was okay.

## 2024-11-25 ENCOUNTER — TELEPHONE ENCOUNTER (OUTPATIENT)
Dept: URBAN - METROPOLITAN AREA CLINIC 107 | Facility: CLINIC | Age: 44
End: 2024-11-25

## 2024-11-25 RX ORDER — FAMOTIDINE 40 MG/1
1 TABLET 30 MINUTES BEFORE BREAKFAST AND DINNER TABLET, FILM COATED ORAL TWICE A DAY
Qty: 180 TABLET | Refills: 1

## 2024-12-03 ENCOUNTER — TELEPHONE ENCOUNTER (OUTPATIENT)
Dept: URBAN - METROPOLITAN AREA CLINIC 107 | Facility: CLINIC | Age: 44
End: 2024-12-03

## 2024-12-03 RX ORDER — LINACLOTIDE 145 UG/1
1 CAPSULE AT LEAST 30 MINUTES BEFORE THE FIRST MEAL OF THE DAY ON AN EMPTY STOMACH CAPSULE, GELATIN COATED ORAL ONCE A DAY
Qty: 90 | Refills: 1
Start: 2024-09-12 | End: 2025-06-01

## 2024-12-05 ENCOUNTER — TELEPHONE ENCOUNTER (OUTPATIENT)
Dept: URBAN - METROPOLITAN AREA CLINIC 113 | Facility: CLINIC | Age: 44
End: 2024-12-05

## 2025-03-12 ENCOUNTER — OFFICE VISIT (OUTPATIENT)
Dept: URBAN - METROPOLITAN AREA CLINIC 107 | Facility: CLINIC | Age: 45
End: 2025-03-12

## 2025-04-02 ENCOUNTER — OFFICE VISIT (OUTPATIENT)
Dept: URBAN - METROPOLITAN AREA CLINIC 107 | Facility: CLINIC | Age: 45
End: 2025-04-02